# Patient Record
Sex: FEMALE | Race: BLACK OR AFRICAN AMERICAN | NOT HISPANIC OR LATINO | Employment: PART TIME | URBAN - METROPOLITAN AREA
[De-identification: names, ages, dates, MRNs, and addresses within clinical notes are randomized per-mention and may not be internally consistent; named-entity substitution may affect disease eponyms.]

---

## 2019-06-06 ENCOUNTER — OFFICE VISIT (OUTPATIENT)
Dept: RETAIL CLINIC | Facility: CLINIC | Age: 29
End: 2019-06-06

## 2019-06-06 VITALS
BODY MASS INDEX: 34.06 KG/M2 | DIASTOLIC BLOOD PRESSURE: 96 MMHG | WEIGHT: 217 LBS | TEMPERATURE: 98.1 F | OXYGEN SATURATION: 98 % | SYSTOLIC BLOOD PRESSURE: 132 MMHG | HEART RATE: 66 BPM | RESPIRATION RATE: 18 BRPM | HEIGHT: 67 IN

## 2019-06-06 DIAGNOSIS — R05.9 COUGH: Primary | ICD-10-CM

## 2019-06-06 PROCEDURE — 99203 OFFICE O/P NEW LOW 30 MIN: CPT | Performed by: NURSE PRACTITIONER

## 2019-06-06 RX ORDER — DEXTROMETHORPHAN HYDROBROMIDE AND PROMETHAZINE HYDROCHLORIDE 15; 6.25 MG/5ML; MG/5ML
5 SYRUP ORAL NIGHTLY PRN
Qty: 120 ML | Refills: 0 | Status: SHIPPED | OUTPATIENT
Start: 2019-06-06

## 2019-06-06 RX ORDER — LORATADINE 10 MG/1
10 TABLET ORAL DAILY
Qty: 30 TABLET | Refills: 0 | Status: SHIPPED | OUTPATIENT
Start: 2019-06-06

## 2019-06-06 RX ORDER — AZITHROMYCIN 250 MG/1
TABLET, FILM COATED ORAL
Qty: 6 TABLET | Refills: 0 | Status: SHIPPED | OUTPATIENT
Start: 2019-06-06

## 2019-06-06 NOTE — PROGRESS NOTES
Subjective   Kenny Kahn is a 28 y.o. female.   Chief Complaint   Patient presents with   • Cough      29 yo b/f presents with complaint of cough keeping her awake at night, no shortness of breath, no wheezing.  She has had cough x 7 days, interrupting sleep past 2 nights.  She denies runny nose, sinus congestion, fever, earache.  She took benadryl last and seemed to sleep a little better.  Refer to HPI/ROS for additional information.      Cough   This is a new problem. The current episode started in the past 7 days. The problem has been waxing and waning. The cough is non-productive. Pertinent negatives include no chills, ear congestion, ear pain, fever, headaches, nasal congestion, postnasal drip, rhinorrhea, sore throat, shortness of breath or wheezing. The symptoms are aggravated by lying down. Treatments tried: antihistamine. The treatment provided moderate relief. There is no history of asthma, bronchiectasis, bronchitis, environmental allergies or pneumonia.        The following portions of the patient's history were reviewed and updated as appropriate: allergies, current medications, past family history, past medical history, past social history, past surgical history and problem list.    Current Outpatient Medications:   •  azithromycin (ZITHROMAX Z-GEOFFREY) 250 MG tablet, Take 2 tablets the first day, then 1 tablet daily for 4 days., Disp: 6 tablet, Rfl: 0  •  loratadine (CLARITIN) 10 MG tablet, Take 1 tablet by mouth Daily., Disp: 30 tablet, Rfl: 0  •  promethazine-dextromethorphan (PROMETHAZINE-DM) 6.25-15 MG/5ML syrup, Take 5 mL by mouth At Night As Needed for Cough., Disp: 120 mL, Rfl: 0    Review of Systems   Constitutional: Negative for activity change, chills and fever.   HENT: Negative for congestion, ear pain, postnasal drip, rhinorrhea, sinus pressure, sinus pain and sore throat.    Eyes: Negative.    Respiratory: Positive for cough. Negative for apnea, choking, chest tightness, shortness of  "breath, wheezing and stridor.    Cardiovascular: Negative.    Gastrointestinal: Negative.    Skin: Negative.    Allergic/Immunologic: Negative for environmental allergies.   Neurological: Negative for headaches.   Psychiatric/Behavioral: Negative.      /96 (BP Location: Left arm, Patient Position: Sitting)   Pulse 66   Temp 98.1 °F (36.7 °C) (Oral)   Resp 18   Ht 168.9 cm (66.5\")   Wt 98.4 kg (217 lb)   SpO2 98%   BMI 34.50 kg/m²     Objective   No Known Allergies    Physical Exam   Constitutional: She is oriented to person, place, and time. She appears well-nourished. No distress.   HENT:   Head: Normocephalic.   Right Ear: External ear normal.   Left Ear: External ear normal.   Nose: Nose normal.   Mouth/Throat: Oropharynx is clear and moist. No oropharyngeal exudate.   Eyes: Conjunctivae are normal.   Neck: Normal range of motion. Neck supple.   Cardiovascular: Normal rate, regular rhythm and normal heart sounds.   Pulmonary/Chest: Effort normal and breath sounds normal. No stridor. No respiratory distress. She has no wheezes. She has no rales. She exhibits no tenderness.   Neurological: She is alert and oriented to person, place, and time.   Skin: Skin is warm. Capillary refill takes less than 2 seconds. She is not diaphoretic.   Psychiatric: She has a normal mood and affect. Her behavior is normal. Thought content normal.   Vitals reviewed.      Assessment/Plan   Kenny was seen today for cough.    Diagnoses and all orders for this visit:    Cough    Other orders  -     promethazine-dextromethorphan (PROMETHAZINE-DM) 6.25-15 MG/5ML syrup; Take 5 mL by mouth At Night As Needed for Cough.  -     loratadine (CLARITIN) 10 MG tablet; Take 1 tablet by mouth Daily.  -     azithromycin (ZITHROMAX Z-GEOFFREY) 250 MG tablet; Take 2 tablets the first day, then 1 tablet daily for 4 days.      Your illness appears viral at this time and will not respond to antibiotics.  Pt voices concern that illness will become " bacterial and they will need an antibiotic.  Antibiotic is prescribed today with the agreement that pt will wait 3-4 days to fill it, and will begin taking it only if illness is not improving or is worse at that time. Pt verbalizes understanding and agrees with treatment plan.     An After Visit Summary was printed, reviewed, and given to the patient. Understanding verbalized and agrees with treatment plan.  If no improvement or becomes worse, follow up with primary or go to [facility].          June 6, 2019 12:43 PM

## 2019-06-06 NOTE — PATIENT INSTRUCTIONS
Cough, Adult  A cough helps to clear your throat and lungs. A cough may last only 2-3 weeks (acute), or it may last longer than 8 weeks (chronic). Many different things can cause a cough. A cough may be a sign of an illness or another medical condition.  Follow these instructions at home:  · Pay attention to any changes in your cough.  · Take medicines only as told by your doctor.  ? If you were prescribed an antibiotic medicine, take it as told by your doctor. Do not stop taking it even if you start to feel better.  ? Talk with your doctor before you try using a cough medicine.  · Drink enough fluid to keep your pee (urine) clear or pale yellow.  · If the air is dry, use a cold steam vaporizer or humidifier in your home.  · Stay away from things that make you cough at work or at home.  · If your cough is worse at night, try using extra pillows to raise your head up higher while you sleep.  · Do not smoke, and try not to be around smoke. If you need help quitting, ask your doctor.  · Do not have caffeine.  · Do not drink alcohol.  · Rest as needed.  Contact a doctor if:  · You have new problems (symptoms).  · You cough up yellow fluid (pus).  · Your cough does not get better after 2-3 weeks, or your cough gets worse.  · Medicine does not help your cough and you are not sleeping well.  · You have pain that gets worse or pain that is not helped with medicine.  · You have a fever.  · You are losing weight and you do not know why.  · You have night sweats.  Get help right away if:  · You cough up blood.  · You have trouble breathing.  · Your heartbeat is very fast.  This information is not intended to replace advice given to you by your health care provider. Make sure you discuss any questions you have with your health care provider.  Document Released: 08/30/2012 Document Revised: 05/25/2017 Document Reviewed: 02/24/2016  [employer] Interactive Patient Education © 2019 [employer]        .Your illness appears viral at  this time and will not respond to antibiotics.  Pt voices concern that illness will become bacterial and they will need an antibiotic.  Antibiotic is prescribed today with the agreement that pt will wait 3-4 days to fill it, and will begin taking it only if illness is not improving or is worse at that time. Pt verbalizes understanding and agrees with treatment plan.